# Patient Record
Sex: FEMALE | Race: WHITE | NOT HISPANIC OR LATINO | Employment: FULL TIME | URBAN - METROPOLITAN AREA
[De-identification: names, ages, dates, MRNs, and addresses within clinical notes are randomized per-mention and may not be internally consistent; named-entity substitution may affect disease eponyms.]

---

## 2017-03-13 ENCOUNTER — ALLSCRIPTS OFFICE VISIT (OUTPATIENT)
Dept: OTHER | Facility: OTHER | Age: 47
End: 2017-03-13

## 2017-04-04 ENCOUNTER — ALLSCRIPTS OFFICE VISIT (OUTPATIENT)
Dept: OTHER | Facility: OTHER | Age: 47
End: 2017-04-04

## 2017-11-08 ENCOUNTER — ALLSCRIPTS OFFICE VISIT (OUTPATIENT)
Dept: OTHER | Facility: OTHER | Age: 47
End: 2017-11-08

## 2017-11-10 NOTE — PROGRESS NOTES
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)   4  Body mass index (BMI) less than or equal to 19 in adult (Z68 1)    Plan  Iron metabolism disorder    · (1) IRON SATURATION %, TIBC; Status:Active; Requested for:2017;    · (Q) HEREDITARY HEMOCHROMA TOSIS DNA MUTATION ANAL; Status:Active; Requested [id number];     Discussion/Summary  The patient was counseled regarding risk factor reductions,-- impressions,-- risks and benefits of treatment options  Chief Complaint  Seen for a f/u on labs  er/cma  History of Present Illness  no extra iron retakeprob in pastperiods about 1 yearmuch red meaton ocpnormalunder controlnormalnormal      Review of Systems   Cardiovascular: no chest pain  Respiratory: no shortness of breath  Gastrointestinal: no abdominal pain  Active Problems    1  Allergic rhinitis (477 9) (J30 9)   2  Benign colon polyp (211 3) (K63 5)   3  Body mass index (BMI) less than or equal to 19 in adult (Z68 1)   4  Colon cancer screening (V76 51) (Z12 11)   5  Colon, diverticulosis (562 10) (K57 30)   6  Congenital nystagmus (379 51) (H55 01)   7  Contraceptive use (V25 40) (Z30 40)   8  Crohn's disease (555 9) (K50 90)   9  Hemorrhoids (455 6) (K64 9)   10  Immunization due (V05 9) (Z23)   11  Irritable bowel syndrome (564 1) (K58 9)   12  Need for hepatitis C screening test (V73 89) (Z11 59)   13  Paroxysmal atrial tachycardia (427 0) (I47 1)   14  Screening breast examination (V76 10) (Z12 31)   15  Screening for cardiovascular, respiratory, and genitourinary diseases  (V81 2,V81 4,V81 6) (Z13 6,Z13 83,Z13 89)   16  Screening for diabetes mellitus (DM) (V77 1) (Z13 1)   17  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)    Past Medical History  1  Acute maxillary sinusitis (461 0) (J01 00)   2  History of Acute maxillary sinusitis (461 0) (J01 00)   3   History of Acute pharyngitis, unspecified etiology (462) (J02 9)   4  Cervicalgia (723 1) (M54 2)   5  Chronic maxillary sinusitis (473 0) (J32 0)   6  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)   7  Dermatitis due to drugs or medicines (693 0) (L27 0)   8  Exposure to viral disease (V01 79) (Z20 828)   9  History of acute sinusitis (V12 69) (Z87 09)   10  History of anal fissures (V12 79) (Z87 19)   11  History of anxiety disorder (V11 8) (Z86 59)   12  History of benign neoplasm of breast (V13 89) (Z86 018)   13  History of conjunctivitis (V12 49) (Z86 69)   14  History of gastroesophageal reflux (GERD) (V12 79) (Z87 19)   15  History of headache (V13 89) (Z87 898)   16  History of impetigo (V13 3) (Z87 2)   17  History of labyrinthitis (V12 49) (Z86 69)   18  History of mitral valve disorder (V12 59) (Z86 79)   19  History of Joint pain, knee (719 46) (M25 569)   20  History of Neck Sprain (847 0)   21  Otalgia, unspecified laterality (388 70) (H92 09)   22  History of Pain in left foot (729 5) (M79 672)   23  History of Palpitations (785 1) (R00 2)   24  Sore throat (462) (J02 9)   25  Well adult on routine health check (V70 0) (Z00 00)    Family History  Brother    1  Family history of colitis (V18 59) (Z83 79)    The family history was reviewed and updated today  Social History     · Never a smoker  The social history was reviewed and updated today  Current Meds   1  Lo Loestrin Fe 1 MG-10 MCG / 10 MCG Oral Tablet; 1 every day; Therapy: (Recorded:24Sep2016) to Recorded   2  Rowasa ENEM; use as dir; Therapy: (Recorded:13Mar2017) to Recorded    Allergies  1  Codeine Derivatives   2  Zithromax TABS   3  Benadryl TABS   4  Entex LIQD   5   Methergine TABS    Vitals  Vital Signs    Recorded: [id number] 11:43AM   Temperature 96 7 F   Heart Rate 80   Respiration 16   Systolic 100   Diastolic 70   Height 5 ft 5 in   Weight 119 lb    BMI Calculated 19 8   BSA Calculated 1 59       Physical Exam   Constitutional  General appearance: No acute distress, well appearing and well nourished  Eyes  Conjunctiva and lids: No swelling, erythema or discharge  Pupils and irises: Equal, round and reactive to light  Ears, Nose, Mouth, and Throat  External inspection of ears and nose: Normal    Otoscopic examination: Tympanic membranes translucent with normal light reflex  Canals patent without erythema  Nasal mucosa, septum, and turbinates: Normal without edema or erythema  Oropharynx: Normal with no erythema, edema, exudate or lesions  Pulmonary  Respiratory effort: No increased work of breathing or signs of respiratory distress  Auscultation of lungs: Clear to auscultation  Cardiovascular  Auscultation of heart: Normal rate and rhythm, normal S1 and S2, without murmurs  Examination of extremities for edema and/or varicosities: Normal    Abdomen  Abdomen: Non-tender, no masses  Liver and spleen: No hepatomegaly or splenomegaly  Lymphatic  Palpation of lymph nodes in neck: No lymphadenopathy  Musculoskeletal  Gait and station: Normal    Digits and nails: Normal without clubbing or cyanosis  Skin  Skin and subcutaneous tissue: Normal without rashes or lesions  Psychiatric  Mood and affect: Normal          Results/Data  PHQ-2 Adult Depression Screening [id number] 09:54PM Sandrine Le     Test Name Result Flag Reference   PHQ-2 Adult Depression Score 0       Over the last two weeks, how often have you been bothered by any of the following problems?  Little interest or pleasure in doing things: Not at all - 0 Feeling down, depressed, or hopeless: Not at all - 0   PHQ-2 Adult Depression Screening Negative           Provider Comments    r/o HMChematology eval      Signatures   Electronically signed by : Lalitha Gongora DO; Nov 8 2017  9:59PM EST                       (Author)

## 2017-11-13 ENCOUNTER — GENERIC CONVERSION - ENCOUNTER (OUTPATIENT)
Dept: OTHER | Facility: OTHER | Age: 47
End: 2017-11-13

## 2017-11-13 LAB
DNA MUTATION ANALYSIS (HISTORICAL): NORMAL
IRON SATN MFR SERPL: 58 % (CALC) (ref 11–50)
IRON SERPL-MCNC: 221 MCG/DL (ref 40–190)
TIBC SERPL-MCNC: 383 MCG/DL (CALC) (ref 250–450)

## 2017-12-04 ENCOUNTER — ALLSCRIPTS OFFICE VISIT (OUTPATIENT)
Dept: OTHER | Facility: OTHER | Age: 47
End: 2017-12-04

## 2017-12-12 ENCOUNTER — TRANSCRIBE ORDERS (OUTPATIENT)
Dept: ADMINISTRATIVE | Facility: HOSPITAL | Age: 47
End: 2017-12-12

## 2017-12-12 DIAGNOSIS — Z12.39 SCREENING BREAST EXAMINATION: Primary | ICD-10-CM

## 2017-12-15 ENCOUNTER — GENERIC CONVERSION - ENCOUNTER (OUTPATIENT)
Dept: OTHER | Facility: OTHER | Age: 47
End: 2017-12-15

## 2017-12-15 ENCOUNTER — HOSPITAL ENCOUNTER (OUTPATIENT)
Dept: RADIOLOGY | Facility: HOSPITAL | Age: 47
Discharge: HOME | End: 2017-12-15
Payer: COMMERCIAL

## 2017-12-15 DIAGNOSIS — Z12.39 SCREENING BREAST EXAMINATION: ICD-10-CM

## 2017-12-15 PROCEDURE — G0202 SCR MAMMO BI INCL CAD: HCPCS

## 2017-12-20 ENCOUNTER — GENERIC CONVERSION - ENCOUNTER (OUTPATIENT)
Dept: OTHER | Facility: OTHER | Age: 47
End: 2017-12-20

## 2017-12-20 ENCOUNTER — HOSPITAL ENCOUNTER (OUTPATIENT)
Dept: RADIOLOGY | Facility: HOSPITAL | Age: 47
Discharge: HOME | End: 2017-12-20
Payer: COMMERCIAL

## 2017-12-20 DIAGNOSIS — R92.8 ABNORMAL SCREENING MAMMOGRAM: ICD-10-CM

## 2017-12-20 PROCEDURE — G0206 DX MAMMO INCL CAD UNI: HCPCS

## 2018-01-04 ENCOUNTER — HOSPITAL ENCOUNTER (OUTPATIENT)
Dept: RADIOLOGY | Facility: HOSPITAL | Age: 48
Discharge: HOME | End: 2018-01-04
Admitting: RADIOLOGY
Payer: COMMERCIAL

## 2018-01-04 ENCOUNTER — HOSPITAL ENCOUNTER (OUTPATIENT)
Dept: RADIOLOGY | Facility: HOSPITAL | Age: 48
Discharge: HOME | End: 2018-01-04
Payer: COMMERCIAL

## 2018-01-04 ENCOUNTER — GENERIC CONVERSION - ENCOUNTER (OUTPATIENT)
Dept: OTHER | Facility: OTHER | Age: 48
End: 2018-01-04

## 2018-01-04 VITALS
RESPIRATION RATE: 16 BRPM | SYSTOLIC BLOOD PRESSURE: 133 MMHG | OXYGEN SATURATION: 98 % | DIASTOLIC BLOOD PRESSURE: 90 MMHG | HEART RATE: 102 BPM

## 2018-01-04 DIAGNOSIS — R92.1 BREAST CALCIFICATIONS: ICD-10-CM

## 2018-01-04 PROCEDURE — 19081 BX BREAST 1ST LESION STRTCTC: CPT

## 2018-01-04 PROCEDURE — 88305 TISSUE EXAM BY PATHOLOGIST: CPT | Performed by: RADIOLOGY

## 2018-01-04 RX ORDER — MESALAMINE 4 G/60ML
ENEMA RECTAL
COMMUNITY
End: 2019-05-21 | Stop reason: ALTCHOICE

## 2018-01-04 RX ORDER — LIDOCAINE HYDROCHLORIDE 10 MG/ML
INJECTION, SOLUTION INFILTRATION; PERINEURAL CODE/TRAUMA/SEDATION MEDICATION
Status: COMPLETED | OUTPATIENT
Start: 2018-01-04 | End: 2018-01-04

## 2018-01-04 RX ADMIN — LIDOCAINE HYDROCHLORIDE 14 ML: 10 INJECTION, SOLUTION INFILTRATION; PERINEURAL at 10:17

## 2018-01-04 NOTE — SEDATION DOCUMENTATION
Procedure ended, pt tolerated without incident  Pressure held to site, no bleeding no hematoma noted  steristrips to puncture sites with gauze dressing covering  Ice pack given  Discharge instructions given and reviewed  Procedure took longer than normal due to small breast size and dense breasts    Pt had episode of bleeding when needle first introduced into skin, procedure stopped pressure held until bleeding subsided  Pt stated she was ok to continue, after that procedure went off without difficulty  Pt discharged home in stable condition

## 2018-01-09 NOTE — RESULT NOTES
Discussion/Summary   Iron level remains high  Gene test shows you do carry the Hemochromatosis gene  Seeing a hematologist is an option as we discussed  Dr Martínez Richmond        Verified Results  (1) IRON SATURATION %, TIBC [id number] 12:51PM Fab Cortez     Test Name Result Flag Reference   IRON, TOTAL 221 mcg/dL H    IRON BINDING CAPACITY 383 mcg/dL (calc)  250-450   % SATURATION 58 % (calc) H 11-50     (Q) HEREDITARY HEMOCHROMA TOSIS DNA MUTATION ANAL [id number] 12:51PM Fab Cortez   REPORT COMMENT:  FASTING:NO     Test Name Result Flag Reference   DNA MUTATION ANALYSIS see note     DNA MUTATION ANALYSIS                                     RESULT: HETEROZYGOUS FOR THE H63D MUTATION                                     INTERPRETATION: DNA testing indicates that this  individual is positive for one copy of H63D mutation in  HFE gene  This individual is negative for the C282Y  mutation  This result reduces the likelihood of  hereditary hemochromatosis (HH) in this individual   However, it does not rule out the presence of other  mutations within the HFE gene or a diagnosis of HH  The risk of this individual carrying a HFE mutation  other than those tested in this assay depends greatly  on family and clinical history as well as ethnicity  This assay does not test for other primary or secondary  iron overload disorders  Consider genetic counseling  and DNA testing for at-risk family members  Jammie Lemus, Ph D , [facility], [facility]   Director, Molecular Genetics     Hereditary hemochromatosis [facility]) is an autosomal  recessive disorder of iron metabolism that results in  iron overload and potential organ failure  It is one  of the most common genetic disorders in individuals of  - ancestry, with an estimated carrier  frequency of 10%  HH is caused by mutations in the HFE  gene  Most individuals with HH (60-90%) are homozygous  for the C282Y mutation  A smaller percentage of  affected individuals are either compound heterozygous  for the C282Y and H63D mutations (3%-8%), or homozygous  for the H63D mutation (approximately 1%)  This assay detects the two mutations in the HFE gene,  C282Y (NM_000410 2: c 845G>A) and H63D (NM_000410 2: c   187C>G), that are commonly associated with HH  The  mutations are detected by multiplex-polymerase chain  reaction (PCR) amplification, followed by digestion of  the amplification products with the restriction enzymes  Rsal and NlaIII, for the detection of the C282Y and  H63D mutations respectively  Fluorescent-labeled  restriction fragments are detected by capillary  electrophoresis  This assay does not detect other mutations in the HFE  gene that can cause HH  Since genetic variation and  other factors can affect the accuracy of direct  mutation testing, these results should be interpreted  in light of clinical and familial data  For assistance with interpretation of these results,  please contact your local [address] [place] [address] genetic  counselor or call [phone])  This test was developed and its analytical  performance characteristics have been determined  by [employer] [place]  It has not been cleared or approved by the FDA  This  assay has been validated pursuant to the CLIA  regulations and is used for clinical purposes  For more information on this test, go to  [email or web address]

## 2018-01-13 VITALS
BODY MASS INDEX: 19.83 KG/M2 | RESPIRATION RATE: 16 BRPM | HEART RATE: 80 BPM | TEMPERATURE: 98.7 F | DIASTOLIC BLOOD PRESSURE: 70 MMHG | HEIGHT: 65 IN | WEIGHT: 119 LBS | SYSTOLIC BLOOD PRESSURE: 100 MMHG

## 2018-01-13 VITALS
TEMPERATURE: 97.9 F | RESPIRATION RATE: 16 BRPM | HEIGHT: 65 IN | BODY MASS INDEX: 19.66 KG/M2 | SYSTOLIC BLOOD PRESSURE: 110 MMHG | WEIGHT: 118 LBS | DIASTOLIC BLOOD PRESSURE: 82 MMHG | HEART RATE: 76 BPM

## 2018-01-14 VITALS
SYSTOLIC BLOOD PRESSURE: 128 MMHG | TEMPERATURE: 96.7 F | WEIGHT: 119 LBS | DIASTOLIC BLOOD PRESSURE: 70 MMHG | RESPIRATION RATE: 16 BRPM | HEIGHT: 65 IN | BODY MASS INDEX: 19.83 KG/M2 | HEART RATE: 80 BPM

## 2018-01-14 NOTE — RESULT NOTES
Verified Results  (Q) HSV 1/2 IGG, HERPESELECT TYPE SPECIFIC AB [id number] 11:56AM Iftikharkateryna Kalin     Test Name Result Flag Reference   HSV 1 IGG TYPE SPECIFIC$AB 3 77 H    HSV 2 IGG TYPE SPECIFIC$AB <0 90     Value          Interpretation                            -----          --------------                            <0 90          Negative                            0  90-1 10      Equivocal                            >1 10          Positive     This assay utilizes recombinant type-specific antigens  to differentiate HSV-1 from HSV-2 infections  A  positive result cannot distinguish between recent and  past infection  If recent HSV infection is suspected  but the results are negative or equivocal, the assay  should be repeated in 4-6 weeks  The performance  characteristics of the assay have not been established  for pediatric populations, immunocompromised patients,  or  screening  HSV 1/2 AB (IGM), IFA W/RFL TO TITER, SERUM [id number] 11:56AM Tl Gagnon   REPORT COMMENT:  FASTING:NO     Test Name Result Flag Reference   HSV 1 IGM SCREEN NEGATIVE  NEGATIVE   HSV 2 IGM SCREEN NEGATIVE  NEGATIVE   The IFA procedure for measuring IgM antibodies to HSV 1  and HSV 2 detects both type-common and type-specific HSV  antibodies  Thus, IgM reactivity to both HSV 1 and HSV 2  may represent cross-reactive HSV antibodies rather than  exposure to both HSV 1 and HSV 2  This assay was developed and its performance  characteristics have been determined by [employer]   Performance characteristics refer to the  analytical performance of the test

## 2018-01-23 NOTE — MISCELLANEOUS
Provider Comments  Provider Comments:   Unable to leave message for patient for missed appointment  Letter sent to call us to reschedule        Signatures   Electronically signed by : Sheron Do, ; Dec  4 2017  9:32AM EST                       (Author)

## 2018-01-31 ENCOUNTER — CLINICAL SUPPORT (OUTPATIENT)
Dept: FAMILY MEDICINE CLINIC | Facility: CLINIC | Age: 48
End: 2018-01-31
Payer: COMMERCIAL

## 2018-01-31 DIAGNOSIS — Z23 NEED FOR INFLUENZA VACCINATION: Primary | ICD-10-CM

## 2018-01-31 PROCEDURE — 90688 IIV4 VACCINE SPLT 0.5 ML IM: CPT

## 2018-01-31 PROCEDURE — 90471 IMMUNIZATION ADMIN: CPT | Performed by: FAMILY MEDICINE

## 2018-02-09 ENCOUNTER — OFFICE VISIT (OUTPATIENT)
Dept: FAMILY MEDICINE CLINIC | Facility: CLINIC | Age: 48
End: 2018-02-09
Payer: COMMERCIAL

## 2018-02-09 VITALS
TEMPERATURE: 98.6 F | BODY MASS INDEX: 20.14 KG/M2 | HEIGHT: 64 IN | RESPIRATION RATE: 18 BRPM | WEIGHT: 118 LBS | HEART RATE: 86 BPM | DIASTOLIC BLOOD PRESSURE: 84 MMHG | SYSTOLIC BLOOD PRESSURE: 126 MMHG

## 2018-02-09 DIAGNOSIS — K52.9 GASTROENTERITIS: Primary | ICD-10-CM

## 2018-02-09 PROBLEM — E83.10 IRON METABOLISM DISORDER: Status: ACTIVE | Noted: 2017-11-08

## 2018-02-09 PROCEDURE — 99213 OFFICE O/P EST LOW 20 MIN: CPT | Performed by: FAMILY MEDICINE

## 2018-02-09 RX ORDER — ONDANSETRON 4 MG/1
4 TABLET, ORALLY DISINTEGRATING ORAL EVERY 6 HOURS PRN
Qty: 20 TABLET | Refills: 0 | Status: SHIPPED | OUTPATIENT
Start: 2018-02-09 | End: 2019-05-21 | Stop reason: ALTCHOICE

## 2018-02-09 NOTE — PATIENT INSTRUCTIONS
Acute Nausea and Vomiting   WHAT YOU NEED TO KNOW:   Acute nausea and vomiting start suddenly, worsen quickly, and last a short time  DISCHARGE INSTRUCTIONS:   Return to the emergency department if:   · You see blood in your vomit or your bowel movements  · You have sudden, severe pain in your chest and upper abdomen after hard vomiting or retching  · You have swelling in your neck and chest      · You are dizzy, cold, and thirsty and your eyes and mouth are dry  · You are urinating very little or not at all  · You have muscle weakness, leg cramps, and trouble breathing  · Your heart is beating much faster than normal      · You continue to vomit for more than 48 hours  Contact your healthcare provider if:   · You have frequent dry heaves (vomiting but nothing comes out)  · Your nausea and vomiting does not get better or go away after you use medicine  · You have questions or concerns about your condition or treatment  Medicines: You may need any of the following:  · Medicines  may be given to calm your stomach and stop your vomiting  You may also need medicines to help you feel more relaxed or to stop nausea and vomiting caused by motion sickness  · Gastrointestinal stimulants  are used to help empty your stomach and bowels  This may help decrease nausea and vomiting  · Take your medicine as directed  Contact your healthcare provider if you think your medicine is not helping or if you have side effects  Tell him or her if you are allergic to any medicine  Keep a list of the medicines, vitamins, and herbs you take  Include the amounts, and when and why you take them  Bring the list or the pill bottles to follow-up visits  Carry your medicine list with you in case of an emergency  Prevent or manage acute nausea and vomiting:   · Do not drink alcohol  Alcohol may upset or irritate your stomach  Too much alcohol can also cause acute nausea and vomiting  · Control stress    Headaches due to stress may cause nausea and vomiting  Find ways to relax and manage your stress  Get more rest and sleep  · Drink more liquids as directed  Vomiting can lead to dehydration  It is important to drink more liquids to help replace lost body fluids  Ask your healthcare provider how much liquid to drink each day and which liquids are best for you  Your provider may recommend that you drink an oral rehydration solution (ORS)  ORS contains water, salts, and sugar that are needed to replace the lost body fluids  Ask what kind of ORS to use, how much to drink, and where to get it  · Eat smaller meals, more often  Eat small amounts of food every 2 to 3 hours, even if you are not hungry  Food in your stomach may decrease your nausea  · Talk to your healthcare provider before you take over-the-counter (OTC) medicines  These medicines can cause serious problems if you use certain other medicines, or you have a medical condition  You may have problems if you use too much or use them for longer than the label says  Follow directions on the label carefully  Follow up with your healthcare provider as directed:  Write down your questions so you remember to ask them during your follow-up visits  © 2017 [address] Information is for End User's use only and may not be sold, redistributed or otherwise used for commercial purposes  All illustrations and images included in CareNotes® are the copyrighted property of A D A [facility]  or [employer]  The above information is an  only  It is not intended as medical advice for individual conditions or treatments  Talk to your doctor, nurse or pharmacist before following any medical regimen to see if it is safe and effective for you

## 2018-02-09 NOTE — PROGRESS NOTES
Assessment/Plan:    No problem-specific Assessment & Plan notes found for this encounter  Diagnoses and all orders for this visit:    Gastroenteritis  -     ondansetron (ZOFRAN-ODT) 4 mg disintegrating tablet; Take 1 tablet (4 mg total) by mouth every 6 (six) hours as needed for nausea or vomiting          Patient Instructions   Acute Nausea and Vomiting   WHAT YOU NEED TO KNOW:   Acute nausea and vomiting start suddenly, worsen quickly, and last a short time  DISCHARGE INSTRUCTIONS:   Return to the emergency department if:   · You see blood in your vomit or your bowel movements  · You have sudden, severe pain in your chest and upper abdomen after hard vomiting or retching  · You have swelling in your neck and chest      · You are dizzy, cold, and thirsty and your eyes and mouth are dry  · You are urinating very little or not at all  · You have muscle weakness, leg cramps, and trouble breathing  · Your heart is beating much faster than normal      · You continue to vomit for more than 48 hours  Contact your healthcare provider if:   · You have frequent dry heaves (vomiting but nothing comes out)  · Your nausea and vomiting does not get better or go away after you use medicine  · You have questions or concerns about your condition or treatment  Medicines: You may need any of the following:  · Medicines  may be given to calm your stomach and stop your vomiting  You may also need medicines to help you feel more relaxed or to stop nausea and vomiting caused by motion sickness  · Gastrointestinal stimulants  are used to help empty your stomach and bowels  This may help decrease nausea and vomiting  · Take your medicine as directed  Contact your healthcare provider if you think your medicine is not helping or if you have side effects  Tell him or her if you are allergic to any medicine  Keep a list of the medicines, vitamins, and herbs you take   Include the amounts, and when and why you take them  Bring the list or the pill bottles to follow-up visits  Carry your medicine list with you in case of an emergency  Prevent or manage acute nausea and vomiting:   · Do not drink alcohol  Alcohol may upset or irritate your stomach  Too much alcohol can also cause acute nausea and vomiting  · Control stress  Headaches due to stress may cause nausea and vomiting  Find ways to relax and manage your stress  Get more rest and sleep  · Drink more liquids as directed  Vomiting can lead to dehydration  It is important to drink more liquids to help replace lost body fluids  Ask your healthcare provider how much liquid to drink each day and which liquids are best for you  Your provider may recommend that you drink an oral rehydration solution (ORS)  ORS contains water, salts, and sugar that are needed to replace the lost body fluids  Ask what kind of ORS to use, how much to drink, and where to get it  · Eat smaller meals, more often  Eat small amounts of food every 2 to 3 hours, even if you are not hungry  Food in your stomach may decrease your nausea  · Talk to your healthcare provider before you take over-the-counter (OTC) medicines  These medicines can cause serious problems if you use certain other medicines, or you have a medical condition  You may have problems if you use too much or use them for longer than the label says  Follow directions on the label carefully  Follow up with your healthcare provider as directed:  Write down your questions so you remember to ask them during your follow-up visits  © 2017 [address] [employer] Information is for End User's use only and may not be sold, redistributed or otherwise used for commercial purposes  All illustrations and images included in CareNotes® are the copyrighted property of A D A [employer]  or [employer]  The above information is an  only   It is not intended as medical advice for individual conditions or treatments  Talk to your doctor, nurse or pharmacist before following any medical regimen to see if it is safe and effective for you  Return if symptoms worsen or fail to improve  Subjective:      Patient ID: Nidia Vu is a 49 y o  female  Chief Complaint   Patient presents with    Diarrhea    Vomiting    Fatigue     for the past day       Pt states she was feeling fine yesterday got home she had a few bites of chicken and snaked - after [place] pt states her stomach started feeling queezy and rumbly  Pt states she had diarrhea, vomiting  Pt states she has less diarrhea but her stomach feels off  HA  Body achs        Diarrhea    This is a new problem  The current episode started yesterday  The problem occurs 5 to 10 times per day  The problem has been gradually improving  The patient states that diarrhea does not awaken her from sleep  Associated symptoms include abdominal pain, bloating, increased flatus and vomiting  Pertinent negatives include no arthralgias, chills, coughing, fever, headaches, myalgias, sweats, URI or weight loss  Vomiting    Associated symptoms include abdominal pain and diarrhea  Pertinent negatives include no arthralgias, chills, coughing, fever, headaches, myalgias, sweats, URI or weight loss  Fatigue   Associated symptoms include abdominal pain, fatigue and vomiting  Pertinent negatives include no arthralgias, chills, coughing, fever, headaches or myalgias  The following portions of the patient's history were reviewed and updated as appropriate: allergies, current medications, past family history, past medical history, past social history, past surgical history and problem list     Review of Systems   Constitutional: Positive for fatigue  Negative for chills, fever and weight loss  Respiratory: Negative for cough  Gastrointestinal: Positive for abdominal pain, bloating, diarrhea, flatus and vomiting     Musculoskeletal: Negative for arthralgias and myalgias  Neurological: Negative for headaches  Current Outpatient Prescriptions   Medication Sig Dispense Refill    mesalamine (ROWASA) 4 g Insert into the rectum      Norethin-Eth Estrad-Fe Biphas (LO LOESTRIN FE) 1 MG-10 MCG / 10 MCG TABS Take by mouth daily      ondansetron (ZOFRAN-ODT) 4 mg disintegrating tablet Take 1 tablet (4 mg total) by mouth every 6 (six) hours as needed for nausea or vomiting 20 tablet 0     No current facility-administered medications for this visit  Objective:    /84   Pulse 86   Temp 98 6 °F (37 °C)   Resp 18   Ht 5' 4" (1 626 m)   Wt 53 5 kg (118 lb)   BMI 20 25 kg/m²        Physical Exam   Constitutional: She appears well-developed and well-nourished  No distress  HENT:   Head: Normocephalic and atraumatic  Right Ear: External ear normal    Left Ear: External ear normal    Eyes: Conjunctivae and EOM are normal  Pupils are equal, round, and reactive to light  Right eye exhibits no discharge  Left eye exhibits no discharge  Neck: Normal range of motion  Cardiovascular: Normal rate, regular rhythm and normal heart sounds  Pulmonary/Chest: Effort normal    Abdominal: Soft  She exhibits no shifting dullness, no pulsatile liver and no abdominal bruit  Bowel sounds are increased  There is generalized tenderness  There is negative Alcaraz's sign  Musculoskeletal: Normal range of motion  Neurological: She is alert  Skin: She is not diaphoretic  Nursing note and vitals reviewed               Cynthia Mora DO

## 2018-08-01 ENCOUNTER — OFFICE VISIT (OUTPATIENT)
Dept: FAMILY MEDICINE CLINIC | Facility: CLINIC | Age: 48
End: 2018-08-01
Payer: COMMERCIAL

## 2018-08-01 VITALS
BODY MASS INDEX: 19.63 KG/M2 | DIASTOLIC BLOOD PRESSURE: 70 MMHG | WEIGHT: 115 LBS | SYSTOLIC BLOOD PRESSURE: 112 MMHG | RESPIRATION RATE: 16 BRPM | TEMPERATURE: 97.1 F | HEIGHT: 64 IN | HEART RATE: 72 BPM

## 2018-08-01 DIAGNOSIS — R31.29 MICROSCOPIC HEMATURIA: ICD-10-CM

## 2018-08-01 DIAGNOSIS — R35.0 URINARY FREQUENCY: Primary | ICD-10-CM

## 2018-08-01 LAB
SL AMB  POCT GLUCOSE, UA: NORMAL
SL AMB LEUKOCYTE ESTERASE,UA: NEGATIVE
SL AMB POCT BILIRUBIN,UA: 1
SL AMB POCT BLOOD,UA: 50
SL AMB POCT CLARITY,UA: ABNORMAL
SL AMB POCT COLOR,UA: YELLOW
SL AMB POCT KETONES,UA: NEGATIVE
SL AMB POCT NITRITE,UA: NEGATIVE
SL AMB POCT PH,UA: 5
SL AMB POCT SPECIFIC GRAVITY,UA: 1.02
SL AMB POCT URINE PROTEIN: NEGATIVE
SL AMB POCT UROBILINOGEN: NORMAL

## 2018-08-01 PROCEDURE — 3008F BODY MASS INDEX DOCD: CPT | Performed by: NURSE PRACTITIONER

## 2018-08-01 PROCEDURE — 99213 OFFICE O/P EST LOW 20 MIN: CPT | Performed by: NURSE PRACTITIONER

## 2018-08-01 PROCEDURE — 81003 URINALYSIS AUTO W/O SCOPE: CPT | Performed by: NURSE PRACTITIONER

## 2018-08-01 NOTE — PROGRESS NOTES
Assessment/Plan:    Urine dip positive for microscopic hematuria  Will send urine for culture  IF negative, repeat urine ordered for next week  Consider urology eval for persistent hematuria or if symptoms persist      Problem List Items Addressed This Visit     None      Visit Diagnoses     Urinary frequency    -  Primary    Relevant Orders    POCT urine dip auto non-scope (Completed)    Urine culture    UA w Reflex to Microscopic w Reflex to Culture -Lab Collect    Microscopic hematuria              There are no Patient Instructions on file for this visit  Return if symptoms worsen or fail to improve  Subjective:      Patient ID: Kelly Brown is a 51 y o  female  Chief Complaint   Patient presents with    Difficulty Urinating     started two days ago   frequent urination       Here today with complaints or urinary frequency and urgency for the past several days  Feels somewhat better today  Denies dysuria, hematuria, flank pain, n/v  She is not taking anything OTC for symptoms  States sometimes she feels this way after intercourse  Denies vaginal discomfort, discharge, or itching  Has appt with ob/gyn in 2 weeks  Was seen in office for similary symptoms about a year ago  Urine dip normal/culture negative  The following portions of the patient's history were reviewed and updated as appropriate: allergies, current medications, past family history, past medical history, past social history, past surgical history and problem list     Review of Systems   Constitutional: Negative for chills, fatigue and fever  Respiratory: Negative for cough and shortness of breath  Cardiovascular: Negative for chest pain  Gastrointestinal: Negative for abdominal pain, diarrhea, nausea and vomiting  Genitourinary: Positive for frequency and urgency  Negative for dysuria, flank pain, hematuria, pelvic pain and vaginal discharge  Musculoskeletal: Negative for arthralgias and back pain  Current Outpatient Prescriptions   Medication Sig Dispense Refill    mesalamine (ROWASA) 4 g Insert into the rectum      Norethin-Eth Estrad-Fe Biphas (LO LOESTRIN FE) 1 MG-10 MCG / 10 MCG TABS Take by mouth daily      ondansetron (ZOFRAN-ODT) 4 mg disintegrating tablet Take 1 tablet (4 mg total) by mouth every 6 (six) hours as needed for nausea or vomiting 20 tablet 0     No current facility-administered medications for this visit  Objective:    /70   Pulse 72   Temp (!) 97 1 °F (36 2 °C)   Resp 16   Ht 5' 4" (1 626 m)   Wt 52 2 kg (115 lb)   BMI 19 74 kg/m²        Physical Exam   Constitutional: She appears well-developed and well-nourished  Cardiovascular: Normal rate, regular rhythm, S1 normal and S2 normal     Pulmonary/Chest: Effort normal and breath sounds normal    Abdominal: Bowel sounds are normal  She exhibits no distension  There is no hepatosplenomegaly  There is no tenderness  There is no CVA tenderness  Skin: Skin is warm, dry and intact  Psychiatric: She has a normal mood and affect  Nursing note and vitals reviewed               Estella Murray

## 2018-08-03 ENCOUNTER — TELEPHONE (OUTPATIENT)
Dept: FAMILY MEDICINE CLINIC | Facility: CLINIC | Age: 48
End: 2018-08-03

## 2018-08-03 NOTE — TELEPHONE ENCOUNTER
Pt aware of normal urine culture  She is still having some symptoms  She will go for repeat UA next week to f/u hematuria    Consider urogyn eval if symptoms persist  JOVANA Martel

## 2019-01-22 ENCOUNTER — OFFICE VISIT (OUTPATIENT)
Dept: FAMILY MEDICINE CLINIC | Facility: CLINIC | Age: 49
End: 2019-01-22
Payer: COMMERCIAL

## 2019-01-22 VITALS
HEART RATE: 80 BPM | TEMPERATURE: 98.6 F | BODY MASS INDEX: 21 KG/M2 | DIASTOLIC BLOOD PRESSURE: 78 MMHG | WEIGHT: 123 LBS | HEIGHT: 64 IN | SYSTOLIC BLOOD PRESSURE: 100 MMHG

## 2019-01-22 DIAGNOSIS — J06.9 VIRAL URI: Primary | ICD-10-CM

## 2019-01-22 PROCEDURE — 3008F BODY MASS INDEX DOCD: CPT | Performed by: FAMILY MEDICINE

## 2019-01-22 PROCEDURE — 99213 OFFICE O/P EST LOW 20 MIN: CPT | Performed by: FAMILY MEDICINE

## 2019-01-22 PROCEDURE — 1036F TOBACCO NON-USER: CPT | Performed by: FAMILY MEDICINE

## 2019-01-22 NOTE — PROGRESS NOTES
Assessment/Plan:    Problem List Items Addressed This Visit     None      Visit Diagnoses     Viral URI    -  Primary      pt seems to be viral she will call the opffice if she does not clear and if so then I can call her in an abx    There are no Patient Instructions on file for this visit  No Follow-up on file  Subjective:      Patient ID: Yung Calix is a 51 y o  female  Chief Complaint   Patient presents with    Sinus Problem     prcma       Pt states she wants to make sure she does not have a sinus infection stuffy  Sore throat congestion  No cough  No fever no chills  Started 3-4 days ago    Started with flonase a few days ago          The following portions of the patient's history were reviewed and updated as appropriate: allergies, current medications, past family history, past medical history, past social history, past surgical history and problem list     Review of Systems   Constitutional: Negative  Negative for activity change, appetite change, chills, diaphoresis and fatigue  HENT: Positive for congestion, ear pain and sinus pressure  Negative for dental problem and sore throat  Eyes: Negative  Negative for photophobia, pain, discharge, redness, itching and visual disturbance  Respiratory: Negative for apnea and chest tightness  Cardiovascular: Negative  Negative for chest pain, palpitations and leg swelling  Gastrointestinal: Negative  Negative for abdominal distention, abdominal pain, constipation and diarrhea  Endocrine: Negative  Negative for cold intolerance and heat intolerance  Genitourinary: Negative  Negative for difficulty urinating and dyspareunia  Musculoskeletal: Negative  Negative for arthralgias and back pain  Skin: Negative  Allergic/Immunologic: Negative for environmental allergies  Neurological: Negative  Negative for dizziness  Psychiatric/Behavioral: Negative  Negative for agitation           Current Outpatient Prescriptions Medication Sig Dispense Refill    mesalamine (ROWASA) 4 g Insert into the rectum      Norethin-Eth Estrad-Fe Biphas (LO LOESTRIN FE) 1 MG-10 MCG / 10 MCG TABS Take by mouth daily      ondansetron (ZOFRAN-ODT) 4 mg disintegrating tablet Take 1 tablet (4 mg total) by mouth every 6 (six) hours as needed for nausea or vomiting (Patient not taking: Reported on 1/22/2019 ) 20 tablet 0     No current facility-administered medications for this visit  Objective:    /78   Pulse 80   Temp 98 6 °F (37 °C)   Ht 5' 4" (1 626 m)   Wt 55 8 kg (123 lb)   BMI 21 11 kg/m²        Physical Exam   Constitutional: She appears well-developed and well-nourished  No distress  HENT:   Head: Normocephalic and atraumatic  Right Ear: External ear normal  Tympanic membrane is bulging  Left Ear: External ear normal  Tympanic membrane is erythematous and bulging  Nose: Mucosal edema present  Mouth/Throat: Posterior oropharyngeal erythema present  No oropharyngeal exudate  Eyes: Pupils are equal, round, and reactive to light  EOM are normal  Right eye exhibits no discharge  Left eye exhibits no discharge  No scleral icterus  Neck: No thyromegaly present  Cardiovascular: Normal rate and normal heart sounds  No murmur heard  Pulmonary/Chest: Effort normal and breath sounds normal  No respiratory distress  She has no wheezes  Abdominal: Soft  Bowel sounds are normal  She exhibits no distension and no mass  There is no tenderness  There is no rebound and no guarding  Musculoskeletal: Normal range of motion  Neurological: She is alert  She displays normal reflexes  Coordination normal    Skin: Skin is warm and dry  No rash noted  She is not diaphoretic  No erythema  Psychiatric: She has a normal mood and affect  Her behavior is normal    Nursing note and vitals reviewed               Kyree Callahan DO

## 2019-05-18 ENCOUNTER — TELEPHONE (OUTPATIENT)
Dept: FAMILY MEDICINE CLINIC | Facility: CLINIC | Age: 49
End: 2019-05-18

## 2019-05-18 RX ORDER — MELATONIN
Refills: 0 | COMMUNITY
Start: 2019-05-08

## 2019-05-18 RX ORDER — ACETAMINOPHEN 325 MG/1
TABLET ORAL
Refills: 0 | COMMUNITY
Start: 2019-05-08 | End: 2019-05-21 | Stop reason: ALTCHOICE

## 2019-05-18 RX ORDER — CALCIUM CARBONATE/VITAMIN D3 500-3.125
TABLET ORAL DAILY
Refills: 0 | COMMUNITY
Start: 2019-05-08

## 2019-05-18 RX ORDER — ASPIRIN 325 MG
TABLET, DELAYED RELEASE (ENTERIC COATED) ORAL
Refills: 0 | COMMUNITY
Start: 2019-05-08 | End: 2019-05-21 | Stop reason: ALTCHOICE

## 2019-05-18 RX ORDER — CELECOXIB 200 MG/1
CAPSULE ORAL
Refills: 0 | COMMUNITY
Start: 2019-05-08 | End: 2019-05-21 | Stop reason: ALTCHOICE

## 2019-05-18 RX ORDER — SENNOSIDES 8.6 MG/1
TABLET ORAL
Refills: 0 | COMMUNITY
Start: 2019-05-08 | End: 2019-05-21 | Stop reason: ALTCHOICE

## 2019-05-18 RX ORDER — DOCUSATE SODIUM 100 MG/1
CAPSULE, LIQUID FILLED ORAL
Refills: 0 | COMMUNITY
Start: 2019-05-08

## 2019-05-19 PROBLEM — S82.141A CLOSED FRACTURE OF RIGHT TIBIAL PLATEAU: Status: ACTIVE | Noted: 2019-05-03

## 2019-05-21 ENCOUNTER — TELEPHONE (OUTPATIENT)
Dept: FAMILY MEDICINE CLINIC | Facility: CLINIC | Age: 49
End: 2019-05-21

## 2019-05-21 ENCOUNTER — HOSPITAL ENCOUNTER (OUTPATIENT)
Dept: NON INVASIVE DIAGNOSTICS | Facility: HOSPITAL | Age: 49
Discharge: HOME | End: 2019-05-21
Attending: FAMILY MEDICINE
Payer: COMMERCIAL

## 2019-05-21 ENCOUNTER — OFFICE VISIT (OUTPATIENT)
Dept: FAMILY MEDICINE CLINIC | Facility: CLINIC | Age: 49
End: 2019-05-21
Payer: COMMERCIAL

## 2019-05-21 VITALS
HEART RATE: 88 BPM | RESPIRATION RATE: 18 BRPM | HEIGHT: 64 IN | SYSTOLIC BLOOD PRESSURE: 126 MMHG | TEMPERATURE: 96.9 F | BODY MASS INDEX: 21.11 KG/M2 | DIASTOLIC BLOOD PRESSURE: 86 MMHG

## 2019-05-21 DIAGNOSIS — Z12.39 SCREENING BREAST EXAMINATION: ICD-10-CM

## 2019-05-21 DIAGNOSIS — M79.89 PAIN AND SWELLING OF RIGHT LOWER LEG: Primary | ICD-10-CM

## 2019-05-21 DIAGNOSIS — Z12.11 SCREENING FOR COLORECTAL CANCER: ICD-10-CM

## 2019-05-21 DIAGNOSIS — M79.661 PAIN AND SWELLING OF RIGHT LOWER LEG: Primary | ICD-10-CM

## 2019-05-21 DIAGNOSIS — M79.661 PAIN AND SWELLING OF RIGHT LOWER LEG: ICD-10-CM

## 2019-05-21 DIAGNOSIS — M79.89 PAIN AND SWELLING OF RIGHT LOWER LEG: ICD-10-CM

## 2019-05-21 DIAGNOSIS — Z12.12 SCREENING FOR COLORECTAL CANCER: ICD-10-CM

## 2019-05-21 PROCEDURE — 1036F TOBACCO NON-USER: CPT | Performed by: FAMILY MEDICINE

## 2019-05-21 PROCEDURE — 93971 EXTREMITY STUDY: CPT

## 2019-05-21 PROCEDURE — 99214 OFFICE O/P EST MOD 30 MIN: CPT | Performed by: FAMILY MEDICINE

## 2019-05-21 PROCEDURE — 93971 EXTREMITY STUDY: CPT | Performed by: SURGERY

## 2019-08-23 ENCOUNTER — TELEPHONE (OUTPATIENT)
Dept: FAMILY MEDICINE CLINIC | Facility: CLINIC | Age: 49
End: 2019-08-23

## 2019-08-23 NOTE — TELEPHONE ENCOUNTER
Fax letter of medical necessity and certificate of need per physical therapy at [facility] form put in Dr Fung Postin folder to be completed and faxed to [facility] @ [phone]    Clemente Hayes MA

## 2020-01-08 ENCOUNTER — OFFICE VISIT (OUTPATIENT)
Dept: FAMILY MEDICINE CLINIC | Facility: CLINIC | Age: 50
End: 2020-01-08
Payer: COMMERCIAL

## 2020-01-08 ENCOUNTER — TELEPHONE (OUTPATIENT)
Dept: FAMILY MEDICINE CLINIC | Facility: CLINIC | Age: 50
End: 2020-01-08

## 2020-01-08 VITALS
SYSTOLIC BLOOD PRESSURE: 118 MMHG | RESPIRATION RATE: 18 BRPM | HEIGHT: 64 IN | TEMPERATURE: 99 F | BODY MASS INDEX: 20.83 KG/M2 | OXYGEN SATURATION: 98 % | HEART RATE: 80 BPM | WEIGHT: 122 LBS | DIASTOLIC BLOOD PRESSURE: 82 MMHG

## 2020-01-08 DIAGNOSIS — B34.9 VIRAL ILLNESS: Primary | ICD-10-CM

## 2020-01-08 PROCEDURE — 99213 OFFICE O/P EST LOW 20 MIN: CPT | Performed by: NURSE PRACTITIONER

## 2020-01-08 RX ORDER — MESALAMINE 4 G/60ML
ENEMA RECTAL AS NEEDED
COMMUNITY
Start: 2020-01-08 | End: 2021-09-21

## 2020-01-08 NOTE — PROGRESS NOTES
Assessment/Plan:    1  Viral illness          BMI Counseling: Body mass index is 20 94 kg/m²  Discussed the patient's BMI with her  Patient Instructions:  Increase fluid intake, saline nasal rinses, and hot tea with honey and lemon  Cool air humidification can be helpful as well  May take Ibuprofen or Tylenol as needed for pain or fevers  Mucinex D for sinus congestion or Coricidin HBP if you have high blood pressure or a heart condition  Mucinex or Robitussin DM are effective for cough and chest congestion  Supportive care discussed and advised  Advised to RTO for any worsening and no improvement  Follow up for no improvement and worsening of conditions  Patient advised and educated when to see immediate medical care  Return if symptoms worsen or fail to improve  Future Appointments   Date Time Provider Kallie Hampton   1/8/2020  4:30 PM JOVANA Quintero [facility]           Subjective:      Patient ID: Marcela Dean is a 47 y o  female  Chief Complaint   Patient presents with    Cough     symptoms for the past day  [id number]    sinus drainage    Generalized Body Aches    Fever         Vitals:  /82   Pulse 80   Temp 99 °F (37 2 °C)   Resp 18   Ht 5' 4" (1 626 m)   Wt 55 3 kg (122 lb)   LMP 01/08/2019 (Approximate)   SpO2 98%   BMI 20 94 kg/m²     HPI  Patient stated that started with cough, low grade fever and body aches yesteday  Denies any sob and chest pain  Taking advil  Grandchild is also sick with cold symptoms         PHQ-9 Depression Screening    PHQ-9:    Frequency of the following problems over the past two weeks:       Little interest or pleasure in doing things:  0 - not at all  Feeling down, depressed, or hopeless:  0 - not at all  PHQ-2 Score:  0             The following portions of the patient's history were reviewed and updated as appropriate: allergies, current medications, past family history, past medical history, past social history, past surgical history and problem list       Review of Systems   Constitutional: Negative for chills, diaphoresis, fatigue, fever (low grade fever) and unexpected weight change  HENT: Positive for congestion  Negative for dental problem, drooling, ear discharge, ear pain, facial swelling, hearing loss, mouth sores, nosebleeds, postnasal drip, rhinorrhea, sinus pressure, sinus pain, sneezing, sore throat, tinnitus, trouble swallowing and voice change  Respiratory: Positive for cough  Negative for chest tightness, shortness of breath and wheezing  Cardiovascular: Negative  Gastrointestinal: Negative for abdominal pain, constipation, diarrhea, nausea and vomiting  Musculoskeletal: Positive for myalgias  Skin: Negative  Neurological: Negative for dizziness, weakness, light-headedness and headaches  Hematological: Negative  Objective:    Social History     Tobacco Use   Smoking Status Never Smoker   Smokeless Tobacco Never Used       Allergies: Allergies   Allergen Reactions    Diphenhydramine      Reaction Date: [id number]; Annotation - [id number]: "Tachycardia,SOB, Jittery, Insomnia"    Entex Lq  [Phenylephrine-Guaifenesin]      Reaction Date: [id number]; Annotation - [id number]: tachycardia    Methylergonovine Other (See Comments)     Reaction Date: [id number];  Annotation - [id number]: HTN  Rapid heart rate  Low blood pressure   Per patient     Azithromycin Rash     Reaction Date: 29Jan2007;     Codeine Anxiety and Other (See Comments)     Reaction Date: [id number];   jittery and increased HR         Current Outpatient Medications   Medication Sig Dispense Refill    mesalamine (ROWASA) 4 g as needed      Norethin-Eth Estrad-Fe Biphas (LO LOESTRIN FE) 1 MG-10 MCG / 10 MCG TABS Take by mouth daily      apixaban (ELIQUIS) 2 5 mg Take by mouth 2 (two) times a day      cholecalciferol (VITAMIN D3) 1,000 units tablet   0     MG capsule   0    OS-DANIEL CALCIUM + D3 500-200 MG-UNIT TABS daily   0     No current facility-administered medications for this visit  Physical Exam   Constitutional: She is oriented to person, place, and time  She appears well-developed and well-nourished  HENT:   Head: Normocephalic  Right Ear: Tympanic membrane, external ear and ear canal normal    Left Ear: Tympanic membrane, external ear and ear canal normal    Nose: Nose normal  Right sinus exhibits no maxillary sinus tenderness and no frontal sinus tenderness  Left sinus exhibits no maxillary sinus tenderness and no frontal sinus tenderness  Mouth/Throat: Oropharynx is clear and moist and mucous membranes are normal    Neck: Neck supple  Cardiovascular: Normal rate, regular rhythm and normal heart sounds  Pulmonary/Chest: Effort normal and breath sounds normal    Abdominal: Normal appearance and bowel sounds are normal  There is no hepatosplenomegaly  There is no tenderness  There is no rebound  Musculoskeletal: Normal range of motion  Lymphadenopathy:        Right cervical: No superficial cervical and no posterior cervical adenopathy present  Left cervical: No superficial cervical and no posterior cervical adenopathy present  Neurological: She is alert and oriented to person, place, and time  Skin: Skin is warm and dry  Psychiatric: She has a normal mood and affect  Her behavior is normal  Judgment and thought content normal    Vitals reviewed                    JOVANA Cruz

## 2020-01-08 NOTE — TELEPHONE ENCOUNTER
Pt just saw Pastor Andersoning today, was told to take Tylenol but doesn't remember how much  Pls call her with the correct dosage

## 2020-01-09 ENCOUNTER — TELEPHONE (OUTPATIENT)
Dept: OTHER | Facility: OTHER | Age: 50
End: 2020-01-09

## 2020-01-09 NOTE — TELEPHONE ENCOUNTER
Clarified to the patient not to exceed more than 3,000mg in a 24 hr period and if she is feeling worse to call us back or go to the ER      Jones Neely MA  Sending to Dr David Mcnally

## 2020-01-09 NOTE — TELEPHONE ENCOUNTER
Camilo Espinoza 1970  CONFIDENTIALTY NOTICE: This fax transmission is intended only for the addressee  It contains information that is legally privileged,  confidential or otherwise protected from use or disclosure  If you are not the intended recipient, you are strictly prohibited from reviewing,  disclosing, copying using or disseminating any of this information or taking any action in reliance on or regarding this information  If you have  received this fax in error, please notify us immediately by telephone so that we can arrange for its return to us  Page:   Call Id: [id number]  Health Call  Standard Call Report  Health Call  Patient Name: Camilo Espinoza  Gender: Female  : 1970  Age: 47 Y 5 M 27 D  Return Phone  Number: [phone] (Home)  Address: [address] [place] [address]/[facility]/Zip: [address] [place] [address]  Practice Name: [facility] [address] Charged:  Physician:  [address] [place] [address] Name:  Relationship To  Patient: Self  Return Phone Number: [phone] (Home)  Presenting Problem: "I was diagnosed with the Flu today  and I would like to know how to  alternate between motrin and tylenol "  Service Type: Triage  Charged Service 1: Bonnie Prince [facility]  38  Name and  Number:  Nurse Assessment  Protocols  Protocol Title Nurse Date/Time  Influenza - Seasonal Markus Medina RN, Amanda Ravi 2020 12:35:10 AM  Question [address]  Time Disposition Final User  2020 12:41:17 AM Home Care Yes Maye Naik  2020 12:41:32 AM RN Triaged [facility]emberton [facility] Advice Given Per Protocol  HOME CARE: You should be able to treat this at home  PAIN OR FEVER MEDICINES: * For pain and fever relief, take  acetaminophen or ibuprofen  * Treat fevers above 101° F (38 3° C)  * The goal of fever therapy is to bring the fever down to a  comfortable level  Remember that fever medicine usually lowers fever 2-3° F (1-1 5° C)   IBUPROFEN (E G , MOTRIN, ADVIL): *  Take 400 mg (two 200 mg pills) by mouth every 6 hours as needed  * Another choice is to take 600 mg (three 200 mg pills) by mouth  every 8 hours as needed  * The most you should take each day is 1,200 mg (six 200 mg pills a day), unless your doctor has told you to  take more  CAUTION - NSAIDS (E G , IBUPROFEN, NAPROXEN): * Do not take nonsteroidal anti-inflammatory drugs (NSAIDs)  if you have stomach problems, kidney disease, heart failure, or other contraindications to using this type of medication  * Do not take  NSAID medications for over 7 days without consulting your PCP  * Do not take NSAID medications if you are pregnant  * You may take  this medicine with or without food  Taking it with food or milk may lessen the chance the drug will upset your stomach  EXPECTED  COURSE: * Fever 2-3 days CALL BACK IF: * Fever lasts over 3 days * You become worse  CARE ADVICE given per INFLUENZA  Tu Harrison 1970  CONFIDENTIALTY NOTICE: This fax transmission is intended only for the addressee  It contains information that is legally privileged,  confidential or otherwise protected from use or disclosure  If you are not the intended recipient, you are strictly prohibited from reviewing,  disclosing, copying using or disseminating any of this information or taking any action in reliance on or regarding this information  If you have  received this fax in error, please notify us immediately by telephone so that we can arrange for its return to us  Page: 2 of 2  Call Id: [id number]  Care Advice Given Per Protocol  - SEASONAL (Adult) guideline  The patient is mainly calling to know if she can alternate Acetaminophen and Ibuprofen  "I don't think  Acetaminophen works as well as Ibuprofen " We disxcussed that she can go to and take Ibuprofen  She just needs to be aware of having  something on her stomach because of the possibility of stomach irritation   We also discussed that if the fever or her aches do not resolve  in 3 days to call the office back for additional information  The patient does verbalize understanding  Caller Understands: Yes  Caller Disagree/Comply: Comply  PreDisposition: Unsure  Comments  User: Dani Sun RN Date/Time: 1/9/2020 12:42:35 AM  Spelling correction needed for notes it is discussed

## 2020-01-11 ENCOUNTER — OFFICE VISIT (OUTPATIENT)
Dept: FAMILY MEDICINE CLINIC | Facility: CLINIC | Age: 50
End: 2020-01-11
Payer: COMMERCIAL

## 2020-01-11 VITALS
DIASTOLIC BLOOD PRESSURE: 80 MMHG | RESPIRATION RATE: 16 BRPM | OXYGEN SATURATION: 96 % | HEART RATE: 89 BPM | SYSTOLIC BLOOD PRESSURE: 110 MMHG | BODY MASS INDEX: 20.83 KG/M2 | HEIGHT: 64 IN | TEMPERATURE: 98.7 F | WEIGHT: 122 LBS

## 2020-01-11 DIAGNOSIS — B96.89 ACUTE BACTERIAL SINUSITIS: Primary | ICD-10-CM

## 2020-01-11 DIAGNOSIS — J01.90 ACUTE BACTERIAL SINUSITIS: Primary | ICD-10-CM

## 2020-01-11 PROCEDURE — 99213 OFFICE O/P EST LOW 20 MIN: CPT | Performed by: FAMILY MEDICINE

## 2020-01-11 PROCEDURE — 3008F BODY MASS INDEX DOCD: CPT | Performed by: FAMILY MEDICINE

## 2020-01-11 PROCEDURE — 1036F TOBACCO NON-USER: CPT | Performed by: FAMILY MEDICINE

## 2020-01-11 RX ORDER — AMOXICILLIN AND CLAVULANATE POTASSIUM 875; 125 MG/1; MG/1
1 TABLET, FILM COATED ORAL EVERY 12 HOURS SCHEDULED
Qty: 20 TABLET | Refills: 0 | Status: SHIPPED | OUTPATIENT
Start: 2020-01-11 | End: 2020-01-21

## 2020-01-11 NOTE — PROGRESS NOTES
Assessment/Plan:    1  Acute bacterial sinusitis  -     amoxicillin-clavulanate (AUGMENTIN) 875-125 mg per tablet; Take 1 tablet by mouth every 12 (twelve) hours for 10 days            There are no Patient Instructions on file for this visit  No follow-ups on file  Subjective:      Patient ID: Alka Dyer is a 47 y o  female  Chief Complaint   Patient presents with    Sinus Problem     vfrma    Headache    Earache    Fever     low primitivo       Pt states she was here 3 days ago after she woke up with a cough and body achs  Pt was advised she was viral - wed night she got worse, had a fevr as per pt  Pt was advised to take tylenol  Thuirsday felt sick Friday as well  States her sinuses hurt  PT states she has pain in her head and they burn  The following portions of the patient's history were reviewed and updated as appropriate: allergies, current medications, past family history, past medical history, past social history, past surgical history and problem list     Review of Systems   Constitutional: Negative  Negative for activity change, appetite change, chills, diaphoresis and fatigue  HENT: Positive for congestion, sinus pressure and sinus pain  Negative for dental problem, ear pain and sore throat  Eyes: Negative  Negative for photophobia, pain, discharge, redness, itching and visual disturbance  Respiratory: Negative for apnea and chest tightness  Cardiovascular: Negative  Negative for chest pain, palpitations and leg swelling  Gastrointestinal: Negative  Negative for abdominal distention, abdominal pain, constipation and diarrhea  Endocrine: Negative  Negative for cold intolerance and heat intolerance  Genitourinary: Negative  Negative for difficulty urinating and dyspareunia  Musculoskeletal: Negative  Negative for arthralgias and back pain  Skin: Negative  Allergic/Immunologic: Negative for environmental allergies  Neurological: Negative    Negative for dizziness  Psychiatric/Behavioral: Negative  Negative for agitation  Current Outpatient Medications   Medication Sig Dispense Refill    Norethin-Eth Estrad-Fe Biphas (LO LOESTRIN FE) 1 MG-10 MCG / 10 MCG TABS Take by mouth daily      amoxicillin-clavulanate (AUGMENTIN) 875-125 mg per tablet Take 1 tablet by mouth every 12 (twelve) hours for 10 days 20 tablet 0    cholecalciferol (VITAMIN D3) 1,000 units tablet   0     MG capsule   0    mesalamine (ROWASA) 4 g as needed      OS-DANIEL CALCIUM + D3 500-200 MG-UNIT TABS daily   0     No current facility-administered medications for this visit  Objective:    /80   Pulse 89   Temp 98 7 °F (37 1 °C)   Resp 16   Ht 5' 4" (1 626 m)   Wt 55 3 kg (122 lb)   LMP 01/08/2019 (Approximate)   SpO2 96%   BMI 20 94 kg/m²        Physical Exam   Constitutional: She appears well-developed and well-nourished  No distress  HENT:   Head: Normocephalic and atraumatic  Right Ear: External ear normal  Tympanic membrane is bulging  Left Ear: External ear normal  Tympanic membrane is bulging  Nose: Mucosal edema present  Mouth/Throat: Oropharynx is clear and moist  No oropharyngeal exudate  Eyes: Pupils are equal, round, and reactive to light  EOM are normal  Right eye exhibits no discharge  Left eye exhibits no discharge  No scleral icterus  Neck: No thyromegaly present  Cardiovascular: Normal rate and normal heart sounds  No murmur heard  Pulmonary/Chest: Effort normal and breath sounds normal  No respiratory distress  She has no wheezes  Abdominal: Soft  Bowel sounds are normal  She exhibits no distension and no mass  There is no tenderness  There is no rebound and no guarding  Musculoskeletal: Normal range of motion  Neurological: She is alert  She displays normal reflexes  Coordination normal    Skin: Skin is warm and dry  No rash noted  She is not diaphoretic  No erythema     Psychiatric: She has a normal mood and affect  Her behavior is normal    Nursing note and vitals reviewed               Stanislaw Benson, DO

## 2020-02-06 ENCOUNTER — TRANSCRIBE ORDERS (OUTPATIENT)
Dept: ADMINISTRATIVE | Facility: HOSPITAL | Age: 50
End: 2020-02-06

## 2020-02-06 DIAGNOSIS — Z12.31 SCREENING MAMMOGRAM FOR HIGH-RISK PATIENT: Primary | ICD-10-CM

## 2020-05-18 ENCOUNTER — TELEMEDICINE (OUTPATIENT)
Dept: FAMILY MEDICINE CLINIC | Facility: CLINIC | Age: 50
End: 2020-05-18
Payer: COMMERCIAL

## 2020-05-18 DIAGNOSIS — Z71.89 ADVICE GIVEN ABOUT 2019 NOVEL CORONAVIRUS INFECTION: ICD-10-CM

## 2020-05-18 DIAGNOSIS — J30.9 ALLERGIC RHINITIS, UNSPECIFIED SEASONALITY, UNSPECIFIED TRIGGER: Primary | ICD-10-CM

## 2020-05-18 DIAGNOSIS — K50.919 CROHN'S DISEASE WITH COMPLICATION, UNSPECIFIED GASTROINTESTINAL TRACT LOCATION (HCC): ICD-10-CM

## 2020-05-18 DIAGNOSIS — I47.1 PAROXYSMAL ATRIAL TACHYCARDIA (HCC): ICD-10-CM

## 2020-05-18 DIAGNOSIS — R10.13 EPIGASTRIC PAIN: ICD-10-CM

## 2020-05-18 PROBLEM — M79.89 PAIN AND SWELLING OF RIGHT LOWER LEG: Status: RESOLVED | Noted: 2019-05-21 | Resolved: 2020-05-18

## 2020-05-18 PROBLEM — M79.661 PAIN AND SWELLING OF RIGHT LOWER LEG: Status: RESOLVED | Noted: 2019-05-21 | Resolved: 2020-05-18

## 2020-05-18 PROBLEM — S82.141A CLOSED FRACTURE OF RIGHT TIBIAL PLATEAU: Status: RESOLVED | Noted: 2019-05-03 | Resolved: 2020-05-18

## 2020-05-18 PROCEDURE — 99214 OFFICE O/P EST MOD 30 MIN: CPT | Performed by: FAMILY MEDICINE

## 2020-05-18 RX ORDER — AZELASTINE 1 MG/ML
2 SPRAY, METERED NASAL 2 TIMES DAILY
Qty: 1 BOTTLE | Refills: 5 | Status: SHIPPED | OUTPATIENT
Start: 2020-05-18

## 2020-05-19 ENCOUNTER — HOSPITAL ENCOUNTER (OUTPATIENT)
Dept: RADIOLOGY | Facility: HOSPITAL | Age: 50
Discharge: HOME | End: 2020-05-19
Payer: COMMERCIAL

## 2020-05-19 VITALS — BODY MASS INDEX: 20.49 KG/M2 | HEIGHT: 64 IN | WEIGHT: 120 LBS

## 2020-05-19 DIAGNOSIS — Z12.31 SCREENING MAMMOGRAM FOR HIGH-RISK PATIENT: ICD-10-CM

## 2020-05-19 PROCEDURE — 77067 SCR MAMMO BI INCL CAD: CPT

## 2020-05-19 PROCEDURE — 77063 BREAST TOMOSYNTHESIS BI: CPT

## 2021-09-20 DIAGNOSIS — K50.10 CROHN'S DISEASE OF LARGE INTESTINE WITHOUT COMPLICATION (HCC): Primary | ICD-10-CM

## 2021-09-21 RX ORDER — MESALAMINE 4 G/60ML
ENEMA RECTAL
Qty: 30 ENEMA | Refills: 2 | Status: SHIPPED | OUTPATIENT
Start: 2021-09-21 | End: 2021-10-20

## 2021-12-15 ENCOUNTER — TELEPHONE (OUTPATIENT)
Dept: GASTROENTEROLOGY | Facility: CLINIC | Age: 51
End: 2021-12-15

## 2022-01-11 ENCOUNTER — TELEMEDICINE (OUTPATIENT)
Dept: GASTROENTEROLOGY | Facility: CLINIC | Age: 52
End: 2022-01-11
Payer: COMMERCIAL

## 2022-01-11 ENCOUNTER — TELEPHONE (OUTPATIENT)
Dept: GASTROENTEROLOGY | Facility: CLINIC | Age: 52
End: 2022-01-11

## 2022-01-11 DIAGNOSIS — Z86.010 HX OF ADENOMATOUS COLONIC POLYPS: ICD-10-CM

## 2022-01-11 DIAGNOSIS — K50.10 CROHN'S DISEASE OF LARGE INTESTINE WITHOUT COMPLICATION (HCC): ICD-10-CM

## 2022-01-11 DIAGNOSIS — K21.00 GASTROESOPHAGEAL REFLUX DISEASE WITH ESOPHAGITIS WITHOUT HEMORRHAGE: Primary | ICD-10-CM

## 2022-01-11 PROCEDURE — 99213 OFFICE O/P EST LOW 20 MIN: CPT | Performed by: INTERNAL MEDICINE

## 2022-01-11 PROCEDURE — 1036F TOBACCO NON-USER: CPT | Performed by: INTERNAL MEDICINE

## 2022-01-11 RX ORDER — OMEPRAZOLE 20 MG/1
20 CAPSULE, DELAYED RELEASE ORAL DAILY
Qty: 90 CAPSULE | Refills: 1 | Status: SHIPPED | OUTPATIENT
Start: 2022-01-11

## 2022-01-11 RX ORDER — MESALAMINE 4 G/60ML
ENEMA RECTAL
Qty: 90 ENEMA | Refills: 2 | Status: SHIPPED | OUTPATIENT
Start: 2022-01-11 | End: 2022-04-06

## 2022-01-11 NOTE — PROGRESS NOTES
Virtual Regular Visit    Verification of patient location:    Patient is located in the following state in which I hold an active license [place]      Assessment/Plan:  Crohn's Crohn's proctosigmoiditis doing proctosigmoiditis doing well  Desires refill well  Desires refill of Rowasa which of Rowasa which we sent we sent in to a pharmacy in in [place] where she currently to a pharmacy in [place] where she currently is is  Additionally has been having some acid  Additionally has been having some acid reflux reflux and tachycardia and tachycardia she is awaiting she is awaiting evaluation of evaluation of her tachycardia  She her tachycardia  She has labs has labs pending which pending which involve his involve his CBC she will also CBC she will also request a CMP  Request a CMP  Colon cancer colon cancer screening is up-to-date  Screening is up-to-date  Regarding her reflux will regarding her reflux will send in a prescription for Prilosec  Send in a prescription for Prilosec  She will otherwise she will otherwise follow-up follow-up in 3 months in 3 months  Pending  Pending course course may need may need endoscopy  Next colonoscopy endoscopy  Next colonoscopy is due is due in July of 2023  In July of 2023       Problem List Items Addressed This Visit        Digestive    Crohn's disease ([facility] [address] )    Relevant Medications    mesalamine (ROWASA) 4 g    Gastroesophageal reflux disease with esophagitis - Primary    Relevant Medications    omeprazole (PriLOSEC) 20 mg delayed release capsule       Other    Hx of adenomatous colonic polyps               Reason for visit is   Chief Complaint   Patient presents with    Virtual Regular Visit        Encounter provider Antony Graham MD    Provider located at [address] [id number] [phone]      Recent Visits  No visits were found meeting these conditions  Showing recent visits within past 7 days and meeting all other requirements  Today's Visits  Date Type Provider Dept   01/11/22 Telemedicine Maulik Desai MD [address]   Showing today's visits and meeting all other requirements  Future Appointments  No visits were found meeting these conditions  Showing future appointments within next 150 days and meeting all other requirements       The patient was identified by name and date of birth  Toy Hsieh was informed that this is a telemedicine visit and that the visit is being conducted through [address] [place] [address] Now and patient was informed that this is a secure, HIPAA-compliant platform  She agrees to proceed     My office door was closed  No one else was in the room  She acknowledged consent and understanding of privacy and security of the video platform  The patient has agreed to participate and understands they can discontinue the visit at any time  Patient is aware this is a billable service  Subjective  Toy Hsieh is a 45 y o  female with a history of Crohn's with a history of Crohn's proctosigmoiditis an anal stricture ring proctosigmoiditis an anal stricture  Doing status doing status quo except for occasional quo except for occasional tachycardia tachycardia which she which she has had has had in the past in the past she is awaiting Cardiology evaluation  She is awaiting Cardiology evaluation  She has been having some acid she has been having some acid reflux and taking over-the-counter Prilosec  Reflux and taking over-the-counter Prilosec  No melena bright red no melena bright red blood per blood per rectum rectum  Desires a refill  Desires a refill of of her Rowasa her Rowasa enemas  Enemas  Liyah CABRERA     Past Medical History:   Diagnosis Date    Anal fissure 02/15/2005    Anxiety disorder 01/29/2007    Basal cell carcinoma (BCC) in situ of skin     Benign neoplasm of breast 03/25/2008    Chronic maxillary sinusitis 03/14/2005    GERD (gastroesophageal reflux disease) 03/26/2012    Impetigo 08/15/2005    Labyrinthitis 03/04/2005    Mitral valve disorder 02/15/2005    Palpitations 01/29/2007    Stenosis, intestine, congenital, large     LAST ASSEESSED: [id number]       Past Surgical History:   Procedure Laterality Date    BREAST BIOPSY Right     sterotactic = right - benign    BREAST CYST EXCISION Left     benign    MAMMO STEREOTACTIC BREAST BIOPSY RIGHT (ALL INC) Right 1/4/2018       Current Outpatient Medications   Medication Sig Dispense Refill    mesalamine (ROWASA) 4 g Instill 1 enema rectally each night 90 enema 2    azelastine (ASTELIN) 0 1 % nasal spray 2 sprays into each nostril 2 (two) times a day Use in each nostril as directed 1 Bottle 5    cholecalciferol (VITAMIN D3) 1,000 units tablet   0     MG capsule   0    Norethin-Eth Estrad-Fe Biphas (LO LOESTRIN FE) 1 MG-10 MCG / 10 MCG TABS Take by mouth daily      omeprazole (PriLOSEC) 20 mg delayed release capsule Take 1 capsule (20 mg total) by mouth daily 90 capsule 1    OS-DANIEL CALCIUM + D3 500-200 MG-UNIT TABS daily   0     No current facility-administered medications for this visit  Allergies   Allergen Reactions    Diphenhydramine      Reaction Date: [id number]; Annotation - [id number]: "Tachycardia,SOB, Jittery, Insomnia"    Entex Lq  [Phenylephrine-Guaifenesin]      Reaction Date: [id number]; Annotation - [id number]: tachycardia    Methylergonovine Other (See Comments)     Reaction Date: [id number]; Annotation - [id number]: HTN  Rapid heart rate  Low blood pressure   Per patient     Azithromycin Rash     Reaction Date: [id number];     Codeine Anxiety and Other (See Comments)     Reaction Date: [id number];   jittery and increased HR       Review of Systems    Video Exam    There were no vitals filed for this visit      Physical Exam no apparent no apparent distress good air good air exchange abdomen appears benign exchange abdomen appears benign    I spent 19:10 minutes directly with the patient during this visit    VIRTUAL VISIT DISCLAIMER      Bethany Roa verbally agrees to participate in [address]  Pt is aware that [address] could be limited without vital signs or the ability to perform a full hands-on physical Ariella De Borgia understands she or the provider may request at any time to terminate the video visit and request the patient to seek care or treatment in person

## 2022-04-06 DIAGNOSIS — K50.10 CROHN'S DISEASE OF LARGE INTESTINE WITHOUT COMPLICATION (HCC): ICD-10-CM

## 2022-04-06 RX ORDER — MESALAMINE 4 G/60ML
ENEMA RECTAL
Qty: 1680 ML | Refills: 2 | Status: SHIPPED | OUTPATIENT
Start: 2022-04-06 | End: 2022-06-27

## 2022-06-27 ENCOUNTER — TELEPHONE (OUTPATIENT)
Dept: GASTROENTEROLOGY | Facility: CLINIC | Age: 52
End: 2022-06-27

## 2022-06-27 DIAGNOSIS — K50.10 CROHN'S DISEASE OF LARGE INTESTINE WITHOUT COMPLICATION (HCC): ICD-10-CM

## 2022-06-27 RX ORDER — MESALAMINE 4 G/60ML
ENEMA RECTAL
Qty: 60 ML | Refills: 2 | Status: SHIPPED | OUTPATIENT
Start: 2022-06-27

## 2022-06-27 RX ORDER — MESALAMINE 4 G/60ML
ENEMA RECTAL
Qty: 60 ML | Refills: 2 | Status: SHIPPED | OUTPATIENT
Start: 2022-06-27 | End: 2022-06-27 | Stop reason: SDUPTHER

## 2022-06-27 NOTE — TELEPHONE ENCOUNTER
Patients GI provider:  Dr Kiana Zavala    Number to return call: [phone]    Reason for call: Pt calling stating she would like refill for Mesalamine to be for 1 years worth      Scheduled procedure/appointment date if applicable: N/A

## 2022-08-08 ENCOUNTER — TELEPHONE (OUTPATIENT)
Dept: FAMILY MEDICINE CLINIC | Facility: CLINIC | Age: 52
End: 2022-08-08

## 2022-08-08 NOTE — TELEPHONE ENCOUNTER
Patient has moved to [place]  Her new address was updated in demographics  Please remove pcp     Veto Lakhani MA

## 2022-08-16 NOTE — TELEPHONE ENCOUNTER
08/16/22 11:01 AM     Thank you for your request  Your request has been received, reviewed, and the patient chart updated  The PCP has successfully been removed with a patient attribution note  This message will now be completed      Thank you  Yuly Solano

## 2022-10-15 DIAGNOSIS — K50.10 CROHN'S DISEASE OF LARGE INTESTINE WITHOUT COMPLICATION (HCC): ICD-10-CM

## 2022-10-16 RX ORDER — MESALAMINE 4 G/60ML
ENEMA RECTAL
Qty: 1680 ML | Refills: 3 | Status: SHIPPED | OUTPATIENT
Start: 2022-10-16

## 2023-02-05 ENCOUNTER — TELEPHONE (OUTPATIENT)
Dept: OTHER | Facility: OTHER | Age: 53
End: 2023-02-05

## 2023-02-05 NOTE — TELEPHONE ENCOUNTER
Patient called to set up a Virtual Appointment   Requesting an Approval for a Refill on Mesalamine Enema

## 2023-02-06 DIAGNOSIS — K50.10 CROHN'S DISEASE OF LARGE INTESTINE WITHOUT COMPLICATION (HCC): ICD-10-CM

## 2023-02-06 NOTE — TELEPHONE ENCOUNTER
Called and spoke with patient  Made a virtual appointment with Dr Lor Felix  Asking for refill, runs out this week  Thank you!

## 2023-02-07 RX ORDER — MESALAMINE 4 G/60ML
ENEMA RECTAL
Qty: 1680 ML | Refills: 3 | Status: SHIPPED | OUTPATIENT
Start: 2023-02-07

## 2023-05-02 ENCOUNTER — RA CDI HCC (OUTPATIENT)
Dept: OTHER | Facility: HOSPITAL | Age: 53
End: 2023-05-02

## 2023-05-02 NOTE — PROGRESS NOTES
[facility]  coding opportunities          Chart Reviewed number of suggestions sent to Provider: 1  [phone]     Patients Insurance        Commercial Insurance: Anibal Mcmahon

## 2023-05-29 DIAGNOSIS — K50.10 CROHN'S DISEASE OF LARGE INTESTINE WITHOUT COMPLICATION (HCC): ICD-10-CM

## 2023-05-29 RX ORDER — MESALAMINE 4 G/60ML
ENEMA RECTAL
Qty: 1680 ML | Refills: 0 | Status: SHIPPED | OUTPATIENT
Start: 2023-05-29

## 2023-06-02 NOTE — TELEPHONE ENCOUNTER
Spoke with patient and she is in [place] and won't be returning until October     Patient will call back in September to schedule appointment for October

## 2023-06-08 ENCOUNTER — TELEPHONE (OUTPATIENT)
Dept: GASTROENTEROLOGY | Facility: CLINIC | Age: 53
End: 2023-06-08